# Patient Record
Sex: MALE | Race: WHITE | HISPANIC OR LATINO
[De-identification: names, ages, dates, MRNs, and addresses within clinical notes are randomized per-mention and may not be internally consistent; named-entity substitution may affect disease eponyms.]

---

## 2018-01-17 ENCOUNTER — TRANSCRIPTION ENCOUNTER (OUTPATIENT)
Age: 23
End: 2018-01-17

## 2018-07-06 ENCOUNTER — TRANSCRIPTION ENCOUNTER (OUTPATIENT)
Age: 23
End: 2018-07-06

## 2020-01-26 ENCOUNTER — TRANSCRIPTION ENCOUNTER (OUTPATIENT)
Age: 25
End: 2020-01-26

## 2022-10-04 PROBLEM — Z00.00 ENCOUNTER FOR PREVENTIVE HEALTH EXAMINATION: Status: ACTIVE | Noted: 2022-10-04

## 2022-10-05 ENCOUNTER — APPOINTMENT (OUTPATIENT)
Dept: PEDIATRIC ORTHOPEDIC SURGERY | Facility: CLINIC | Age: 27
End: 2022-10-05

## 2022-10-05 VITALS — TEMPERATURE: 98.2 F | BODY MASS INDEX: 25.71 KG/M2 | WEIGHT: 160 LBS | HEIGHT: 66 IN

## 2022-10-05 DIAGNOSIS — M23.8X1 OTHER INTERNAL DERANGEMENTS OF RIGHT KNEE: ICD-10-CM

## 2022-10-05 DIAGNOSIS — Z82.49 FAMILY HISTORY OF ISCHEMIC HEART DISEASE AND OTHER DISEASES OF THE CIRCULATORY SYSTEM: ICD-10-CM

## 2022-10-05 DIAGNOSIS — Z83.3 FAMILY HISTORY OF DIABETES MELLITUS: ICD-10-CM

## 2022-10-05 PROCEDURE — 99202 OFFICE O/P NEW SF 15 MIN: CPT

## 2022-10-05 PROCEDURE — 73562 X-RAY EXAM OF KNEE 3: CPT

## 2022-10-05 NOTE — HISTORY OF PRESENT ILLNESS
[de-identified] : This 26-year-old healthy gentleman is seen for evaluation of his right knee.  This patient runs 5-6 days/week and over the past 2 weeks or so he has had discomfort to the knee with no obvious 1 traumatic event other than his repetitive running.  He states that he has been 3 miles into a run when he noted discomfort about the knee.  This has not been associated with any significant swelling.  He has had mild stiffness though has maintained good motion.  No instability clicking or popping.  Prior to this no complaints his general health is excellent.

## 2022-10-05 NOTE — ASSESSMENT
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  He is also been placed on diclofenac and will return if he is not progressing

## 2022-10-05 NOTE — PHYSICAL EXAM
[de-identified] : Exam today reveals he has normal alignment to both lower extremities.  He ambulates without limp.  He has no significant effusion on today's visit to the right knee.  He has full motion to the right hip and knee.  The knee has no evidence of instability on stress of the cruciate/collateral ligaments.  He does have mild pain on patellofemoral grind negative apprehension.  He is also tender about the iliotibial band and lateral joint line.  Negative Steinmann/June.  Popliteal fossa calf neurovascular exam are negative.\par \par X-rays ordered and taken today of the right knee were unremarkable

## 2022-10-07 RX ORDER — DICLOFENAC SODIUM 50 MG/1
50 TABLET, DELAYED RELEASE ORAL TWICE DAILY
Qty: 60 | Refills: 2 | Status: ACTIVE | COMMUNITY
Start: 2022-10-07 | End: 1900-01-01

## 2023-11-20 ENCOUNTER — NON-APPOINTMENT (OUTPATIENT)
Age: 28
End: 2023-11-20

## 2024-12-19 ENCOUNTER — NON-APPOINTMENT (OUTPATIENT)
Age: 29
End: 2024-12-19

## 2025-01-04 ENCOUNTER — NON-APPOINTMENT (OUTPATIENT)
Age: 30
End: 2025-01-04

## 2025-05-11 ENCOUNTER — NON-APPOINTMENT (OUTPATIENT)
Age: 30
End: 2025-05-11

## 2025-05-28 ENCOUNTER — NON-APPOINTMENT (OUTPATIENT)
Age: 30
End: 2025-05-28